# Patient Record
Sex: FEMALE | Race: OTHER | HISPANIC OR LATINO | URBAN - METROPOLITAN AREA
[De-identification: names, ages, dates, MRNs, and addresses within clinical notes are randomized per-mention and may not be internally consistent; named-entity substitution may affect disease eponyms.]

---

## 2024-10-12 ENCOUNTER — OFFICE VISIT (OUTPATIENT)
Dept: URGENT CARE | Age: 44
End: 2024-10-12
Payer: COMMERCIAL

## 2024-10-12 VITALS
SYSTOLIC BLOOD PRESSURE: 144 MMHG | TEMPERATURE: 98 F | DIASTOLIC BLOOD PRESSURE: 72 MMHG | HEART RATE: 88 BPM | BODY MASS INDEX: 24.39 KG/M2 | WEIGHT: 142.86 LBS | OXYGEN SATURATION: 99 % | RESPIRATION RATE: 16 BRPM | HEIGHT: 64 IN

## 2024-10-12 DIAGNOSIS — H16.002 CORNEAL ULCER, LEFT: Primary | ICD-10-CM

## 2024-10-12 RX ORDER — MOXIFLOXACIN 5 MG/ML
1 SOLUTION/ DROPS OPHTHALMIC
Qty: 5 ML | Refills: 0 | Status: SHIPPED | OUTPATIENT
Start: 2024-10-12 | End: 2024-10-19

## 2024-10-12 ASSESSMENT — ENCOUNTER SYMPTOMS
EYE DISCHARGE: 0
EYE ITCHING: 0
EYE REDNESS: 1
EYE PAIN: 1
PHOTOPHOBIA: 0

## 2024-10-12 ASSESSMENT — PAIN SCALES - GENERAL: PAINLEVEL: 3

## 2024-10-12 NOTE — PROGRESS NOTES
"Subjective   Patient ID: Meka Mancini is a 43 y.o. female. They present today with a chief complaint of Eye Pain (Possible foreign body in left eye).    History of Present Illness  Patient presents with concern for suspected foreign object in the eye. She states she woke up yesterday with pain in the eye and suspected a foreign object, states she can see it on the lens. States she wears contact lenses, just took the lens out for the exam. States she tried to get into several eye clinics today but was unable to get in and was advised to go to .       Eye Pain      Past Medical History  Allergies as of 10/12/2024    (No Known Allergies)       (Not in a hospital admission)       Past Medical History:   Diagnosis Date    Other specified health status     No pertinent past medical history       Past Surgical History:   Procedure Laterality Date    TONSILLECTOMY  03/24/2017    Tonsillectomy        reports that she has never smoked. She has never used smokeless tobacco. She reports that she does not drink alcohol and does not use drugs.    Review of Systems  Review of Systems   Eyes:  Positive for pain and redness. Negative for photophobia, discharge, itching and visual disturbance.                                  Objective    Vitals:    10/12/24 1141   BP: 144/72   Pulse: 88   Resp: 16   Temp: 36.7 °C (98 °F)   SpO2: 99%   Weight: 64.8 kg (142 lb 13.7 oz)   Height: 1.626 m (5' 4\")     Patient's last menstrual period was 10/01/2024 (approximate).    Physical Exam  Vitals reviewed.   Constitutional:       Appearance: Normal appearance.   Eyes:      General: Lids are normal.         Left eye: No foreign body or discharge.        Comments: Small circular opaque lesion noted, slight erythema   Cardiovascular:      Rate and Rhythm: Normal rate.   Pulmonary:      Effort: Pulmonary effort is normal.   Skin:     General: Skin is warm and dry.   Neurological:      Mental Status: She is alert.         Procedures    Point of Care " Test & Imaging Results from this visit  No results found for this visit on 10/12/24.   No results found.    Diagnostic study results (if any) were reviewed by [facility].    Assessment/Plan   Allergies, medications, history, and pertinent labs/EKGs/Imaging reviewed by VARGAS Herrera. Provided rx for hourly antibiotic eye drops as recommended by emmy. Advised she f/up with opthomology today, if possible. Discussed facilities that may be open today, advised she let them know of diagnosis, they will be more likely to get her seen.     Medical Decision Making  At time of discharge patient was clinically well-appearing and HDS for outpatient management. The patient and/or family was educated regarding diagnosis, supportive care, OTC and Rx medications. The patient and/or family was given the opportunity to ask questions prior to discharge.  They verbalized understanding of my discussion of the plans for treatment, expected course, indications to return to  or seek further evaluation in ED, and the need for timely follow up as directed.   They were provided with a work/school excuse if requested.      Orders and Diagnoses  There are no diagnoses linked to this encounter.    Medical Admin Record      Patient disposition: Home    Electronically signed by [facility]  11:56 AM

## 2025-03-25 LAB
NON-UH HIE FREE T4: 1.08 NG/DL (ref 0.89–1.76)
NON-UH HIE TSH: 1.64 UIU/ML (ref 0.55–4.78)

## 2025-05-15 LAB
NON-UH HIE BASO COUNT: 0.11 X1000 (ref 0–0.2)
NON-UH HIE BASOS %: 1.2 %
NON-UH HIE DIFF?: NORMAL
NON-UH HIE EOS COUNT: 0.14 X1000 (ref 0–0.5)
NON-UH HIE EOSIN %: 1.6 %
NON-UH HIE HCT: 40.4 % (ref 36–46)
NON-UH HIE HGB: 13.8 G/DL (ref 12–16)
NON-UH HIE INSTR WBC: 8.9
NON-UH HIE LYMPH %: 24.6 %
NON-UH HIE LYMPH COUNT: 2.18 X1000 (ref 1.2–4.8)
NON-UH HIE MCH: 30.7 PG (ref 27–34)
NON-UH HIE MCHC: 34.2 G/DL (ref 32–37)
NON-UH HIE MCV: 89.6 FL (ref 80–100)
NON-UH HIE MONO %: 6.3 %
NON-UH HIE MONO COUNT: 0.56 X1000 (ref 0.1–1)
NON-UH HIE MPV: 8.2 FL (ref 7.4–10.4)
NON-UH HIE NEUTROPHIL %: 66.4 %
NON-UH HIE NEUTROPHIL COUNT (ANC): 5.9 X1000 (ref 1.4–8.8)
NON-UH HIE NUCLEATED RBC: 0 /100WBC
NON-UH HIE PLATELET: 405 X10 (ref 150–450)
NON-UH HIE RBC: 4.51 X10 (ref 4.2–5.4)
NON-UH HIE RDW: 12.8 % (ref 11.5–14.5)
NON-UH HIE WBC: 8.9 X10 (ref 4.5–11)